# Patient Record
Sex: MALE | Race: WHITE | NOT HISPANIC OR LATINO
[De-identification: names, ages, dates, MRNs, and addresses within clinical notes are randomized per-mention and may not be internally consistent; named-entity substitution may affect disease eponyms.]

---

## 2018-07-03 PROBLEM — Z00.00 ENCOUNTER FOR PREVENTIVE HEALTH EXAMINATION: Status: ACTIVE | Noted: 2018-07-03

## 2018-07-09 ENCOUNTER — APPOINTMENT (OUTPATIENT)
Dept: ORTHOPEDIC SURGERY | Facility: CLINIC | Age: 28
End: 2018-07-09
Payer: COMMERCIAL

## 2018-07-09 VITALS
HEIGHT: 74 IN | WEIGHT: 200 LBS | DIASTOLIC BLOOD PRESSURE: 89 MMHG | SYSTOLIC BLOOD PRESSURE: 129 MMHG | HEART RATE: 71 BPM | BODY MASS INDEX: 25.67 KG/M2

## 2018-07-09 DIAGNOSIS — Z80.9 FAMILY HISTORY OF MALIGNANT NEOPLASM, UNSPECIFIED: ICD-10-CM

## 2018-07-09 PROCEDURE — 73502 X-RAY EXAM HIP UNI 2-3 VIEWS: CPT | Mod: 58

## 2018-07-09 PROCEDURE — 73590 X-RAY EXAM OF LOWER LEG: CPT | Mod: 58,RT

## 2018-07-09 PROCEDURE — 29515 APPLICATION SHORT LEG SPLINT: CPT | Mod: 58,RT

## 2018-07-09 PROCEDURE — 29085 APPL CAST HAND&LWR FOREARM: CPT | Mod: 79,LT

## 2018-07-09 PROCEDURE — 99024 POSTOP FOLLOW-UP VISIT: CPT

## 2018-07-09 PROCEDURE — 73110 X-RAY EXAM OF WRIST: CPT | Mod: 79,LT

## 2018-07-09 RX ORDER — DOCUSATE SODIUM 100 MG/1
100 CAPSULE ORAL
Refills: 0 | Status: ACTIVE | COMMUNITY

## 2018-07-09 RX ORDER — ASPIRIN 325 MG/1
325 TABLET, FILM COATED ORAL
Refills: 0 | Status: ACTIVE | COMMUNITY

## 2018-07-09 RX ORDER — OXYCODONE HYDROCHLORIDE AND ACETAMINOPHEN 5; 325 MG/1; MG/1
5-325 TABLET ORAL
Refills: 0 | Status: ACTIVE | COMMUNITY

## 2018-07-16 ENCOUNTER — TRANSCRIPTION ENCOUNTER (OUTPATIENT)
Age: 28
End: 2018-07-16

## 2018-07-16 ENCOUNTER — APPOINTMENT (OUTPATIENT)
Dept: ORTHOPEDIC SURGERY | Facility: CLINIC | Age: 28
End: 2018-07-16
Payer: COMMERCIAL

## 2018-07-16 PROCEDURE — 99024 POSTOP FOLLOW-UP VISIT: CPT

## 2018-08-06 ENCOUNTER — APPOINTMENT (OUTPATIENT)
Dept: ORTHOPEDIC SURGERY | Facility: CLINIC | Age: 28
End: 2018-08-06
Payer: COMMERCIAL

## 2018-08-06 PROCEDURE — 99024 POSTOP FOLLOW-UP VISIT: CPT

## 2018-08-06 PROCEDURE — 73110 X-RAY EXAM OF WRIST: CPT | Mod: 79,LT

## 2018-08-06 PROCEDURE — 73522 X-RAY EXAM HIPS BI 3-4 VIEWS: CPT | Mod: 58

## 2018-08-06 PROCEDURE — 73590 X-RAY EXAM OF LOWER LEG: CPT | Mod: 58,RT

## 2018-09-17 ENCOUNTER — APPOINTMENT (OUTPATIENT)
Dept: ORTHOPEDIC SURGERY | Facility: CLINIC | Age: 28
End: 2018-09-17
Payer: COMMERCIAL

## 2018-09-17 DIAGNOSIS — S32.415D: ICD-10-CM

## 2018-09-17 PROCEDURE — 73110 X-RAY EXAM OF WRIST: CPT | Mod: 79,LT

## 2018-09-17 PROCEDURE — 73590 X-RAY EXAM OF LOWER LEG: CPT | Mod: 58,RT

## 2018-09-17 PROCEDURE — 99024 POSTOP FOLLOW-UP VISIT: CPT

## 2018-09-17 PROCEDURE — 73502 X-RAY EXAM HIP UNI 2-3 VIEWS: CPT | Mod: 58

## 2018-10-29 ENCOUNTER — APPOINTMENT (OUTPATIENT)
Dept: ORTHOPEDIC SURGERY | Facility: CLINIC | Age: 28
End: 2018-10-29
Payer: COMMERCIAL

## 2018-10-29 PROCEDURE — 73590 X-RAY EXAM OF LOWER LEG: CPT | Mod: RT

## 2018-10-29 PROCEDURE — 99214 OFFICE O/P EST MOD 30 MIN: CPT

## 2018-11-01 ENCOUNTER — OUTPATIENT (OUTPATIENT)
Dept: OUTPATIENT SERVICES | Facility: HOSPITAL | Age: 28
LOS: 1 days | End: 2018-11-01
Payer: COMMERCIAL

## 2018-11-01 VITALS
HEIGHT: 74 IN | TEMPERATURE: 98 F | SYSTOLIC BLOOD PRESSURE: 130 MMHG | DIASTOLIC BLOOD PRESSURE: 80 MMHG | HEART RATE: 77 BPM | WEIGHT: 207.23 LBS | RESPIRATION RATE: 14 BRPM

## 2018-11-01 DIAGNOSIS — S82.301A UNSPECIFIED FRACTURE OF LOWER END OF RIGHT TIBIA, INITIAL ENCOUNTER FOR CLOSED FRACTURE: ICD-10-CM

## 2018-11-01 DIAGNOSIS — Z01.818 ENCOUNTER FOR OTHER PREPROCEDURAL EXAMINATION: ICD-10-CM

## 2018-11-01 DIAGNOSIS — S82.209A UNSPECIFIED FRACTURE OF SHAFT OF UNSPECIFIED TIBIA, INITIAL ENCOUNTER FOR CLOSED FRACTURE: ICD-10-CM

## 2018-11-01 DIAGNOSIS — Z96.7 PRESENCE OF OTHER BONE AND TENDON IMPLANTS: Chronic | ICD-10-CM

## 2018-11-01 PROCEDURE — G0463: CPT

## 2018-11-01 NOTE — H&P PST ADULT - NS SC CAGE ALCOHOL CUT DOWN
Patient awake, alert, and playful with mother and father at the bedside. Patient on continuous observation via pulse oximetry. Patient still febrile, Dr. Feldman aware, patient to receive home medication of albuterol, budesonide, and amox. Patient to received Tylenol of fever. Patient skin noted to be mottled. Wheezing heard bilaterally with course rhonchi. Patient to receive racemic epi neb treatment. no

## 2018-11-01 NOTE — H&P PST ADULT - RS GEN PE MLT RESP DETAILS PC
clear to auscultation bilaterally/no subcutaneous emphysema/no rales/no intercostal retractions/no rhonchi

## 2018-11-01 NOTE — H&P PST ADULT - MUSCULOSKELETAL COMMENTS
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

## 2018-11-01 NOTE — H&P PST ADULT - PROBLEM SELECTOR PLAN 1
scheduled for exchange nailing right tibia (synthesis) possible fibula osteotomy on 11/9/18  Pre op instructions

## 2018-11-01 NOTE — H&P PST ADULT - NSANTHOSAYNRD_GEN_A_CORE
No. SABRINA screening performed.  STOP BANG Legend: 0-2 = LOW Risk; 3-4 = INTERMEDIATE Risk; 5-8 = HIGH Risk
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
## 2018-11-08 ENCOUNTER — TRANSCRIPTION ENCOUNTER (OUTPATIENT)
Age: 28
End: 2018-11-08

## 2018-11-08 RX ORDER — FAMOTIDINE 10 MG/ML
1 INJECTION INTRAVENOUS
Qty: 0 | Refills: 0 | COMMUNITY
Start: 2018-11-08 | End: 2018-11-09

## 2018-11-09 ENCOUNTER — INPATIENT (INPATIENT)
Facility: HOSPITAL | Age: 28
LOS: 0 days | Discharge: HOME | DRG: 908 | End: 2018-11-10
Attending: SPECIALIST | Admitting: SPECIALIST
Payer: COMMERCIAL

## 2018-11-09 ENCOUNTER — APPOINTMENT (OUTPATIENT)
Dept: ORTHOPEDIC SURGERY | Facility: HOSPITAL | Age: 28
End: 2018-11-09

## 2018-11-09 ENCOUNTER — TRANSCRIPTION ENCOUNTER (OUTPATIENT)
Age: 28
End: 2018-11-09

## 2018-11-09 ENCOUNTER — RESULT REVIEW (OUTPATIENT)
Age: 28
End: 2018-11-09

## 2018-11-09 VITALS
TEMPERATURE: 98 F | OXYGEN SATURATION: 100 % | HEIGHT: 74 IN | HEART RATE: 86 BPM | SYSTOLIC BLOOD PRESSURE: 145 MMHG | RESPIRATION RATE: 18 BRPM | DIASTOLIC BLOOD PRESSURE: 83 MMHG | WEIGHT: 203.71 LBS

## 2018-11-09 DIAGNOSIS — Z96.7 PRESENCE OF OTHER BONE AND TENDON IMPLANTS: Chronic | ICD-10-CM

## 2018-11-09 DIAGNOSIS — Z01.818 ENCOUNTER FOR OTHER PREPROCEDURAL EXAMINATION: ICD-10-CM

## 2018-11-09 DIAGNOSIS — S82.301A UNSPECIFIED FRACTURE OF LOWER END OF RIGHT TIBIA, INITIAL ENCOUNTER FOR CLOSED FRACTURE: ICD-10-CM

## 2018-11-09 LAB
HCT VFR BLD CALC: 36.6 % — LOW (ref 39–50)
HGB BLD-MCNC: 12.6 G/DL — LOW (ref 13–17)
MCHC RBC-ENTMCNC: 29 PG — SIGNIFICANT CHANGE UP (ref 27–34)
MCHC RBC-ENTMCNC: 34.4 GM/DL — SIGNIFICANT CHANGE UP (ref 32–36)
MCV RBC AUTO: 84.1 FL — SIGNIFICANT CHANGE UP (ref 80–100)
NRBC # BLD: 0 /100 WBCS — SIGNIFICANT CHANGE UP (ref 0–0)
PLATELET # BLD AUTO: 259 K/UL — SIGNIFICANT CHANGE UP (ref 150–400)
RBC # BLD: 4.35 M/UL — SIGNIFICANT CHANGE UP (ref 4.2–5.8)
RBC # FLD: 14 % — SIGNIFICANT CHANGE UP (ref 10.3–14.5)
WBC # BLD: 18.1 K/UL — HIGH (ref 3.8–10.5)
WBC # FLD AUTO: 18.1 K/UL — HIGH (ref 3.8–10.5)

## 2018-11-09 PROCEDURE — 12345: CPT | Mod: NC

## 2018-11-09 PROCEDURE — 88302 TISSUE EXAM BY PATHOLOGIST: CPT | Mod: 26

## 2018-11-09 PROCEDURE — 88300 SURGICAL PATH GROSS: CPT | Mod: 26

## 2018-11-09 RX ORDER — METOCLOPRAMIDE HCL 10 MG
10 TABLET ORAL ONCE
Qty: 0 | Refills: 0 | Status: DISCONTINUED | OUTPATIENT
Start: 2018-11-09 | End: 2018-11-10

## 2018-11-09 RX ORDER — ONDANSETRON 8 MG/1
4 TABLET, FILM COATED ORAL ONCE
Qty: 0 | Refills: 0 | Status: DISCONTINUED | OUTPATIENT
Start: 2018-11-09 | End: 2018-11-09

## 2018-11-09 RX ORDER — ONDANSETRON 8 MG/1
4 TABLET, FILM COATED ORAL EVERY 6 HOURS
Qty: 0 | Refills: 0 | Status: DISCONTINUED | OUTPATIENT
Start: 2018-11-09 | End: 2018-11-10

## 2018-11-09 RX ORDER — CEFAZOLIN SODIUM 1 G
2000 VIAL (EA) INJECTION EVERY 8 HOURS
Qty: 0 | Refills: 0 | Status: COMPLETED | OUTPATIENT
Start: 2018-11-09 | End: 2018-11-10

## 2018-11-09 RX ORDER — DOCUSATE SODIUM 100 MG
1 CAPSULE ORAL
Qty: 0 | Refills: 0 | COMMUNITY
Start: 2018-11-09

## 2018-11-09 RX ORDER — HYDROMORPHONE HYDROCHLORIDE 2 MG/ML
0.5 INJECTION INTRAMUSCULAR; INTRAVENOUS; SUBCUTANEOUS
Qty: 0 | Refills: 0 | Status: DISCONTINUED | OUTPATIENT
Start: 2018-11-09 | End: 2018-11-09

## 2018-11-09 RX ORDER — ENOXAPARIN SODIUM 100 MG/ML
40 INJECTION SUBCUTANEOUS EVERY 24 HOURS
Qty: 0 | Refills: 0 | Status: DISCONTINUED | OUTPATIENT
Start: 2018-11-10 | End: 2018-11-10

## 2018-11-09 RX ORDER — SODIUM CHLORIDE 9 MG/ML
1000 INJECTION, SOLUTION INTRAVENOUS
Qty: 0 | Refills: 0 | Status: DISCONTINUED | OUTPATIENT
Start: 2018-11-09 | End: 2018-11-09

## 2018-11-09 RX ORDER — OXYCODONE HYDROCHLORIDE 5 MG/1
10 TABLET ORAL
Qty: 0 | Refills: 0 | Status: DISCONTINUED | OUTPATIENT
Start: 2018-11-09 | End: 2018-11-10

## 2018-11-09 RX ORDER — CEFAZOLIN SODIUM 1 G
2000 VIAL (EA) INJECTION ONCE
Qty: 0 | Refills: 0 | Status: COMPLETED | OUTPATIENT
Start: 2018-11-09 | End: 2018-11-09

## 2018-11-09 RX ORDER — CHLORHEXIDINE GLUCONATE 213 G/1000ML
1 SOLUTION TOPICAL ONCE
Qty: 0 | Refills: 0 | Status: COMPLETED | OUTPATIENT
Start: 2018-11-09 | End: 2018-11-09

## 2018-11-09 RX ORDER — OXYCODONE HYDROCHLORIDE 5 MG/1
5 TABLET ORAL
Qty: 0 | Refills: 0 | Status: DISCONTINUED | OUTPATIENT
Start: 2018-11-09 | End: 2018-11-10

## 2018-11-09 RX ORDER — PANTOPRAZOLE SODIUM 20 MG/1
40 TABLET, DELAYED RELEASE ORAL
Qty: 0 | Refills: 0 | Status: DISCONTINUED | OUTPATIENT
Start: 2018-11-09 | End: 2018-11-10

## 2018-11-09 RX ORDER — HYDROMORPHONE HYDROCHLORIDE 2 MG/ML
0.5 INJECTION INTRAMUSCULAR; INTRAVENOUS; SUBCUTANEOUS
Qty: 0 | Refills: 0 | Status: DISCONTINUED | OUTPATIENT
Start: 2018-11-09 | End: 2018-11-10

## 2018-11-09 RX ORDER — SODIUM CHLORIDE 9 MG/ML
1000 INJECTION, SOLUTION INTRAVENOUS
Qty: 0 | Refills: 0 | Status: DISCONTINUED | OUTPATIENT
Start: 2018-11-09 | End: 2018-11-10

## 2018-11-09 RX ORDER — DOCUSATE SODIUM 100 MG
100 CAPSULE ORAL THREE TIMES A DAY
Qty: 0 | Refills: 0 | Status: DISCONTINUED | OUTPATIENT
Start: 2018-11-09 | End: 2018-11-10

## 2018-11-09 RX ADMIN — ONDANSETRON 4 MILLIGRAM(S): 8 TABLET, FILM COATED ORAL at 15:11

## 2018-11-09 RX ADMIN — OXYCODONE HYDROCHLORIDE 5 MILLIGRAM(S): 5 TABLET ORAL at 21:19

## 2018-11-09 RX ADMIN — HYDROMORPHONE HYDROCHLORIDE 0.5 MILLIGRAM(S): 2 INJECTION INTRAMUSCULAR; INTRAVENOUS; SUBCUTANEOUS at 14:19

## 2018-11-09 RX ADMIN — Medication 100 MILLIGRAM(S): at 18:25

## 2018-11-09 RX ADMIN — OXYCODONE HYDROCHLORIDE 5 MILLIGRAM(S): 5 TABLET ORAL at 21:49

## 2018-11-09 RX ADMIN — HYDROMORPHONE HYDROCHLORIDE 0.5 MILLIGRAM(S): 2 INJECTION INTRAMUSCULAR; INTRAVENOUS; SUBCUTANEOUS at 13:51

## 2018-11-09 RX ADMIN — Medication 100 MILLIGRAM(S): at 21:19

## 2018-11-09 RX ADMIN — CHLORHEXIDINE GLUCONATE 1 APPLICATION(S): 213 SOLUTION TOPICAL at 09:23

## 2018-11-09 RX ADMIN — HYDROMORPHONE HYDROCHLORIDE 0.5 MILLIGRAM(S): 2 INJECTION INTRAMUSCULAR; INTRAVENOUS; SUBCUTANEOUS at 14:24

## 2018-11-09 RX ADMIN — HYDROMORPHONE HYDROCHLORIDE 0.5 MILLIGRAM(S): 2 INJECTION INTRAMUSCULAR; INTRAVENOUS; SUBCUTANEOUS at 23:08

## 2018-11-09 RX ADMIN — HYDROMORPHONE HYDROCHLORIDE 0.5 MILLIGRAM(S): 2 INJECTION INTRAMUSCULAR; INTRAVENOUS; SUBCUTANEOUS at 23:38

## 2018-11-09 RX ADMIN — HYDROMORPHONE HYDROCHLORIDE 0.5 MILLIGRAM(S): 2 INJECTION INTRAMUSCULAR; INTRAVENOUS; SUBCUTANEOUS at 13:30

## 2018-11-09 RX ADMIN — Medication 200 MILLIGRAM(S): at 17:05

## 2018-11-09 RX ADMIN — SODIUM CHLORIDE 100 MILLILITER(S): 9 INJECTION, SOLUTION INTRAVENOUS at 13:17

## 2018-11-09 NOTE — DISCHARGE NOTE ADULT - HOSPITAL COURSE
This patient was admitted to [facility] with a history of nonunion of the right tibia.  Patient went to Pre-Surgical Testing at [facility] and was medically cleared to undergo procedure. Patient underwent exchange nailing right tibia by Dr. Eliseo Henriquez on 11/9/18. Procedure was well tolerated.  No operative or tess-operative complications arose during patients hospital course.  Patient received antibiotic according to SCIP guidelines for infection prevention.  Lovenox was given for DVT prophylaxis.  Anesthesia, Medical Hospitalist, Physical Therapy and Occupational Therapy were consulted. Patient is stable for discharge with a good prognosis.  Appropriate discharge instructions and medications are provided in this document.

## 2018-11-09 NOTE — DISCHARGE NOTE ADULT - MEDICATION SUMMARY - MEDICATIONS TO TAKE
I will START or STAY ON the medications listed below when I get home from the hospital:    oxyCODONE 5 mg oral tablet  -- 1 tab(s) by mouth every 6 hours, As Needed -Mild Pain (1 - 3) MDD:4  -- Indication: For as needed for pain    Aspirin Enteric Coated 81 mg oral delayed release tablet  -- 1 tab(s) by mouth 2 times a day MDD:2  -- Swallow whole.  Do not crush.  Take with food or milk.    -- Indication: For prevention of blood clots    docusate sodium 100 mg oral capsule  -- 1 cap(s) by mouth 3 times a day  -- Indication: For stool softner

## 2018-11-09 NOTE — DISCHARGE NOTE ADULT - NS AS ACTIVITY OBS
Stairs allowed/Walking-Outdoors allowed/Walking-Indoors allowed/No Heavy lifting/straining/Do not drive or operate machinery

## 2018-11-09 NOTE — DISCHARGE NOTE ADULT - MEDICATION SUMMARY - MEDICATIONS TO STOP TAKING
I will STOP taking the medications listed below when I get home from the hospital:    Pepcid AC Maximum Strength 20 mg oral tablet  -- 1 tab(s) by mouth 2 times a day(x2 preop)

## 2018-11-09 NOTE — BRIEF OPERATIVE NOTE - PROCEDURE
<<-----Click on this checkbox to enter Procedure Exchange nailing for nonunion of right tibial fracture  11/09/2018    Active  Flaco Loredo

## 2018-11-09 NOTE — DISCHARGE NOTE ADULT - INSTRUCTIONS
none  For Constipation :   • Increase your water intake. Drink at least 8 glasses of water daily.  • Try adding fiber to your diet by eating fruits, vegetables and foods that are rich in grains.  • If you do experience constipation, you may take an over-the-counter stool softener/laxative such as Velma Colace, Senek[place] or  Milk of Magnesia.

## 2018-11-09 NOTE — DISCHARGE NOTE ADULT - PATIENT PORTAL LINK FT
You can access the [facility], offered by [facility], by registering with the following website: [email or web address]

## 2018-11-09 NOTE — DISCHARGE NOTE ADULT - CARE PLAN
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

## 2018-11-09 NOTE — DISCHARGE NOTE ADULT - CARE PROVIDER_API CALL
Eliseo Henriquez), Orthopaedic Surgery  [address] [place] [address]  Phone: [phone]  Fax: [phone]

## 2018-11-09 NOTE — PROGRESS NOTE ADULT - SUBJECTIVE AND OBJECTIVE BOX
Orthopaedic Post Op  Note    Procedure: Exchange IM nailing of tibial non-union  Surgeon: Eliseo Henriquez    28y Male comfortable, without complaints. Reported pain score = 3  Denies N/V, CP, SOB, numbness/tingling of extremities.    PE:  Vital Signs Last 24 Hrs  T(C): 36.8 (09 Nov 2018 15:05), Max: 36.9 (09 Nov 2018 09:24)  T(F): 98.2 (09 Nov 2018 15:05), Max: 98.4 (09 Nov 2018 09:24)  HR: 93 (09 Nov 2018 15:05) (86 - 138)  BP: 135/81 (09 Nov 2018 15:05) (135/81 - 150/89)  RR: 16 (09 Nov 2018 15:05) (10 - 19)  SpO2: 98% (09 Nov 2018 15:05) (97% - 100%)  General: Pt alert and oriented   Lungs: + BS CTA bilaterally  Heart: +S1 & S2 heard, RRR  Abd: + BS heard, soft, NT, ND  Right Lower Extremity Dressing: C/D/I   Bilateral LEs:  Motor:   5/5 dorsiflexion, plantarflexion, EHL  Sensation intact to LT   2+ DP Pulses  SCDs in place    A/P: 28y Male POD#0 s/p Right Tibial Non-union IM Nail exchange  - Stable  -  Oxycodone for Pain Control   - DVT ppx: Lovenox, change to Aspirin 81mg twice daily for 6 weeks upon d/c  - Velma op IV abx: Ancef  - PT, OT per protocol  - F/U AM Labs  DCP = home tomorrow

## 2018-11-10 VITALS
HEART RATE: 101 BPM | TEMPERATURE: 98 F | SYSTOLIC BLOOD PRESSURE: 126 MMHG | RESPIRATION RATE: 18 BRPM | DIASTOLIC BLOOD PRESSURE: 68 MMHG | OXYGEN SATURATION: 97 %

## 2018-11-10 LAB
ANION GAP SERPL CALC-SCNC: 11 MMOL/L — SIGNIFICANT CHANGE UP (ref 5–17)
BUN SERPL-MCNC: 11 MG/DL — SIGNIFICANT CHANGE UP (ref 7–23)
CALCIUM SERPL-MCNC: 9.5 MG/DL — SIGNIFICANT CHANGE UP (ref 8.4–10.5)
CHLORIDE SERPL-SCNC: 104 MMOL/L — SIGNIFICANT CHANGE UP (ref 96–108)
CO2 SERPL-SCNC: 27 MMOL/L — SIGNIFICANT CHANGE UP (ref 22–31)
CREAT SERPL-MCNC: 0.91 MG/DL — SIGNIFICANT CHANGE UP (ref 0.5–1.3)
GLUCOSE SERPL-MCNC: 108 MG/DL — HIGH (ref 70–99)
HCT VFR BLD CALC: 34.3 % — LOW (ref 39–50)
HGB BLD-MCNC: 12.2 G/DL — LOW (ref 13–17)
MCHC RBC-ENTMCNC: 29 PG — SIGNIFICANT CHANGE UP (ref 27–34)
MCHC RBC-ENTMCNC: 35.6 GM/DL — SIGNIFICANT CHANGE UP (ref 32–36)
MCV RBC AUTO: 81.7 FL — SIGNIFICANT CHANGE UP (ref 80–100)
NRBC # BLD: 0 /100 WBCS — SIGNIFICANT CHANGE UP (ref 0–0)
PLATELET # BLD AUTO: 263 K/UL — SIGNIFICANT CHANGE UP (ref 150–400)
POTASSIUM SERPL-MCNC: 3.7 MMOL/L — SIGNIFICANT CHANGE UP (ref 3.5–5.3)
POTASSIUM SERPL-SCNC: 3.7 MMOL/L — SIGNIFICANT CHANGE UP (ref 3.5–5.3)
RBC # BLD: 4.2 M/UL — SIGNIFICANT CHANGE UP (ref 4.2–5.8)
RBC # FLD: 14.4 % — SIGNIFICANT CHANGE UP (ref 10.3–14.5)
SODIUM SERPL-SCNC: 142 MMOL/L — SIGNIFICANT CHANGE UP (ref 135–145)
WBC # BLD: 12.61 K/UL — HIGH (ref 3.8–10.5)
WBC # FLD AUTO: 12.61 K/UL — HIGH (ref 3.8–10.5)

## 2018-11-10 RX ORDER — ASPIRIN/CALCIUM CARB/MAGNESIUM 324 MG
1 TABLET ORAL
Qty: 84 | Refills: 0 | OUTPATIENT
Start: 2018-11-10 | End: 2018-12-21

## 2018-11-10 RX ORDER — OXYCODONE HYDROCHLORIDE 5 MG/1
1 TABLET ORAL
Qty: 20 | Refills: 0 | OUTPATIENT
Start: 2018-11-10

## 2018-11-10 RX ADMIN — OXYCODONE HYDROCHLORIDE 10 MILLIGRAM(S): 5 TABLET ORAL at 10:30

## 2018-11-10 RX ADMIN — Medication 100 MILLIGRAM(S): at 01:31

## 2018-11-10 RX ADMIN — PANTOPRAZOLE SODIUM 40 MILLIGRAM(S): 20 TABLET, DELAYED RELEASE ORAL at 05:22

## 2018-11-10 RX ADMIN — OXYCODONE HYDROCHLORIDE 5 MILLIGRAM(S): 5 TABLET ORAL at 14:01

## 2018-11-10 RX ADMIN — OXYCODONE HYDROCHLORIDE 10 MILLIGRAM(S): 5 TABLET ORAL at 09:34

## 2018-11-10 RX ADMIN — Medication 100 MILLIGRAM(S): at 05:22

## 2018-11-10 RX ADMIN — OXYCODONE HYDROCHLORIDE 5 MILLIGRAM(S): 5 TABLET ORAL at 13:21

## 2018-11-10 RX ADMIN — ENOXAPARIN SODIUM 40 MILLIGRAM(S): 100 INJECTION SUBCUTANEOUS at 09:34

## 2018-11-10 RX ADMIN — OXYCODONE HYDROCHLORIDE 10 MILLIGRAM(S): 5 TABLET ORAL at 06:41

## 2018-11-10 RX ADMIN — Medication 100 MILLIGRAM(S): at 13:21

## 2018-11-10 RX ADMIN — OXYCODONE HYDROCHLORIDE 10 MILLIGRAM(S): 5 TABLET ORAL at 06:11

## 2018-11-10 NOTE — PROGRESS NOTE ADULT - SUBJECTIVE AND OBJECTIVE BOX
Post Op     ELVIS BRENNAN      28y        Male                                                                                                                 T(C): 36.9 (11-10-18 @ 07:53), Max: 36.9 (11-10-18 @ 07:53)  HR: 101 (11-10-18 @ 07:53) (80 - 101)  BP: 126/68 (11-10-18 @ 07:53) (120/75 - 150/77)  RR: 18 (11-10-18 @ 07:53) (16 - 18)  SpO2: 97% (11-10-18 @ 07:53) (97% - 100%)  Wt(kg): --    S/P exchange of  tibial tammy 2ndery to nonunion    Patient denies shortness of breath, chest pain, dyspnea, No complaints  Pain is 3 /10    Physical Exam    Extremity: Bilaterally:  No holmon                                           No Cord                                          PAS on b/l                                          Neurovascular intact                                          Motor intact EHL/FHL                                          Sensation intact                                          Pulses intact DP/PT                                         Calves Soft                                         Dressing Clean / Dry / Intact changed   due to saturation                                          Capillary refill with 5 seconds                          12.2   12.61 )-----------( 263      ( 10 Nov 2018 06:42 )             34.3       11-10    142  |  104  |  11  ----------------------------<  108<H>  3.7   |  27  |  0.91    Ca    9.5      10 Nov 2018 06:42        A/P  -- S/P exchange of  tibial tammy 2ndery to nonunion    -  Medicine To Follow   - DVT prophylaxis PAS ASA 81mg po bid  - PT & OT   - Analagesia  - Incentive Spirometry  - Discharge Planning  - Safety Precautions  -  CBC , BMP daily   Dr. Henriquez called aware patient to be discharged  patient to follow up in office on thursday  - Discussed post op  care  / wound care and gave dressing to patient  . patient and  family understand all instructions  - Plan as per MD

## 2018-11-10 NOTE — OCCUPATIONAL THERAPY INITIAL EVALUATION ADULT - ADDITIONAL COMMENTS
Lives with his girlfriend 4 steps to enter, 4 inside Tub. Can use stall at parents house. Has a commode and seat for shower. Has crutches.

## 2018-11-10 NOTE — OCCUPATIONAL THERAPY INITIAL EVALUATION ADULT - NS ASR FOLLOW COMMAND OT EVAL
Patient educated regarding fall prevention and home/hospital safety. Pt educated on use of AE/DME recommended for safety & the need to call for assistance. Patient with good understanding. Role of OT and patient goals discussed./100% of the time

## 2018-11-10 NOTE — PHYSICAL THERAPY INITIAL EVALUATION ADULT - RANGE OF MOTION EXAMINATION, REHAB EVAL
bilateral upper extremity ROM was WNL (within normal limits)/Left LE ROM was WNL (within normal limits)/right hip wnl; knee n/t due to surgical precautions

## 2018-11-10 NOTE — PHYSICAL THERAPY INITIAL EVALUATION ADULT - DID THE PATIENT HAVE SURGERY?
Scheduled for Exchange nailing right tibia; possible fibula osteotomy/yes
yes/Right tibial nail exchange

## 2018-11-12 PROBLEM — N41.9 INFLAMMATORY DISEASE OF PROSTATE, UNSPECIFIED: Chronic | Status: ACTIVE | Noted: 2018-11-01
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
## 2018-11-15 ENCOUNTER — APPOINTMENT (OUTPATIENT)
Dept: ORTHOPEDIC SURGERY | Facility: CLINIC | Age: 28
End: 2018-11-15
Payer: COMMERCIAL

## 2018-11-15 PROCEDURE — 99024 POSTOP FOLLOW-UP VISIT: CPT

## 2018-11-15 PROCEDURE — 73590 X-RAY EXAM OF LOWER LEG: CPT | Mod: RT

## 2018-11-20 ENCOUNTER — APPOINTMENT (OUTPATIENT)
Dept: ORTHOPEDIC SURGERY | Facility: CLINIC | Age: 28
End: 2018-11-20
Payer: COMMERCIAL

## 2018-11-20 VITALS — WEIGHT: 200 LBS | HEIGHT: 74 IN | BODY MASS INDEX: 25.67 KG/M2

## 2018-11-20 DIAGNOSIS — S52.532D COLLES' FRACTURE OF LEFT RADIUS, SUBSEQUENT ENCOUNTER FOR CLOSED FRACTURE WITH ROUTINE HEALING: ICD-10-CM

## 2018-11-20 PROCEDURE — 99024 POSTOP FOLLOW-UP VISIT: CPT

## 2018-12-17 ENCOUNTER — APPOINTMENT (OUTPATIENT)
Dept: ORTHOPEDIC SURGERY | Facility: CLINIC | Age: 28
End: 2018-12-17
Payer: COMMERCIAL

## 2018-12-17 PROCEDURE — 99024 POSTOP FOLLOW-UP VISIT: CPT

## 2018-12-17 PROCEDURE — 73590 X-RAY EXAM OF LOWER LEG: CPT | Mod: RT

## 2019-01-28 ENCOUNTER — APPOINTMENT (OUTPATIENT)
Dept: ORTHOPEDIC SURGERY | Facility: CLINIC | Age: 29
End: 2019-01-28
Payer: COMMERCIAL

## 2019-01-28 VITALS — HEIGHT: 74 IN | WEIGHT: 200 LBS | BODY MASS INDEX: 25.67 KG/M2

## 2019-01-28 PROCEDURE — 73590 X-RAY EXAM OF LOWER LEG: CPT | Mod: RT

## 2019-01-28 PROCEDURE — 99024 POSTOP FOLLOW-UP VISIT: CPT

## 2019-01-28 NOTE — END OF VISIT
[FreeTextEntry3] : I, Eliseo Henriquez MD, ordering physician, have read and attest that all the information, medical decision making and discharge instructions within are true and accurate\par

## 2019-01-28 NOTE — ADDENDUM
[FreeTextEntry1] : I, Mary Coyle wrote this note acting as a scribe for Dr. Eliseo Henriquez on Jan 28, 2019.

## 2019-01-28 NOTE — HISTORY OF PRESENT ILLNESS
[de-identified] : s/p exchange nailing with fibular osteotomy. \par DOS: 11/09/18 [de-identified] : The patient returns for 4th  postoperative visit after undergoing exchange nailing with fibular osteotomy at [facility]. The surgery was on 11/09/2018. The patient is recovering well. He is WBAT without assistive device.  He has residual pain at the superior and inferior postop op incisions. He has been receiving PT and would like to continue with it. Patient was given a script for a bone stimulator during his last office visit which he has been using. He has not yet returned to work as a  due to his pain and overall condition. He still has trouble descending the stairs.  [de-identified] : Patient is WDWN, alert and in NAD. Breathing is unlabored. He is grossly oriented to person, place and time. \par Right lower leg: the incisions are healed with no signs of infection. Mild tenderness and edema are present. He is able to do leg lifts. [de-identified] : AP, Lat ,and oblique views of the right ankle were obtained and revealed s/p fibular osteotomy and distal tibia nail and locking bolt hardware is in good position. Fracture gap is significantly decreased. Alignment is acceptable. Fracture healing has improved significantly since last office visit.  [de-identified] : A new script for PT was given. Range of motion exercises were encouraged. NSAIDs as tolerated. \par Follow up in 6 weeks for repeat x-rays.

## 2019-03-25 ENCOUNTER — APPOINTMENT (OUTPATIENT)
Dept: ORTHOPEDIC SURGERY | Facility: CLINIC | Age: 29
End: 2019-03-25
Payer: COMMERCIAL

## 2019-03-25 DIAGNOSIS — S82.263F: ICD-10-CM

## 2019-03-25 PROCEDURE — 73590 X-RAY EXAM OF LOWER LEG: CPT | Mod: RT

## 2019-03-25 PROCEDURE — 99214 OFFICE O/P EST MOD 30 MIN: CPT

## 2019-03-25 NOTE — END OF VISIT
[FreeTextEntry3] : I, Eliseo Henriquez MD, ordering physician, have read and attest that all the information, medical decision making and discharge instructions within are true and accurate.

## 2019-03-25 NOTE — PHYSICAL EXAM
[de-identified] : Patient is WDWN, alert and in NAD. Breathing is unlabored. He is grossly oriented to person, place and time. \par \par Right lower leg: The incisions are healed with no signs of infection. Mild tenderness and edema are present. He is able to do leg lifts. \par  [de-identified] : AP, Lat ,and oblique views of the right tibia were obtained and revealed s/p fibular osteotomy andl tibia nail. Hardware  is in good position. Fracture is healing well. Alignment is acceptable. Fracture healing has improved significantly since last office visit. \par

## 2019-03-25 NOTE — HISTORY OF PRESENT ILLNESS
[de-identified] : The patient returns for a followup visit after undergoing exchange nailing with fibular osteotomy at [facility]. The surgery was on 11/09/2018. The patient is recovering well. He is FWB without assistive device. He has residual pain at the superior and inferior postop op incisions. Patient still has weakness in the leg and is still unable to run. He has been receiving PT. Patient was given a script for a bone stimulator which he has been using. He has not yet returned to work as a  due to his pain and overall condition.

## 2019-03-25 NOTE — ADDENDUM
[FreeTextEntry1] : I, Mary Coyle wrote this note acting as a scribe for Dr. Eliseo Henriquez on Mar 25, 2019.

## 2019-03-25 NOTE — DISCUSSION/SUMMARY
[de-identified] : Continue with PT and gym. Patient may return to physical activity, no restrictions. \par Range of motion exercises were encouraged. \par NSAIDs as tolerated. \par Follow up in 3 months. Xrays upon return.

## 2019-04-24 PROCEDURE — 80048 BASIC METABOLIC PNL TOTAL CA: CPT

## 2019-04-24 PROCEDURE — 97165 OT EVAL LOW COMPLEX 30 MIN: CPT

## 2019-04-24 PROCEDURE — C1713: CPT

## 2019-04-24 PROCEDURE — 97161 PT EVAL LOW COMPLEX 20 MIN: CPT | Mod: CI

## 2019-04-24 PROCEDURE — G8980: CPT | Mod: CI

## 2019-04-24 PROCEDURE — 76000 FLUOROSCOPY <1 HR PHYS/QHP: CPT

## 2019-04-24 PROCEDURE — G8978: CPT | Mod: CI

## 2019-04-24 PROCEDURE — 36415 COLL VENOUS BLD VENIPUNCTURE: CPT

## 2019-04-24 PROCEDURE — C1889: CPT

## 2019-04-24 PROCEDURE — 85027 COMPLETE CBC AUTOMATED: CPT

## 2019-04-24 PROCEDURE — 97164 PT RE-EVAL EST PLAN CARE: CPT

## 2019-04-24 PROCEDURE — G8979: CPT | Mod: CI

## 2019-04-24 PROCEDURE — 94664 DEMO&/EVAL PT USE INHALER: CPT

## 2019-04-24 PROCEDURE — 88300 SURGICAL PATH GROSS: CPT

## 2019-04-24 PROCEDURE — 88302 TISSUE EXAM BY PATHOLOGIST: CPT

## 2019-05-03 ENCOUNTER — CHART COPY (OUTPATIENT)
Age: 29
End: 2019-05-03

## 2019-06-24 ENCOUNTER — APPOINTMENT (OUTPATIENT)
Dept: ORTHOPEDIC SURGERY | Facility: CLINIC | Age: 29
End: 2019-06-24
Payer: COMMERCIAL

## 2019-06-24 VITALS — BODY MASS INDEX: 25.67 KG/M2 | HEIGHT: 74 IN | WEIGHT: 200 LBS

## 2019-06-24 DIAGNOSIS — S82.251S: ICD-10-CM

## 2019-06-24 PROCEDURE — 99213 OFFICE O/P EST LOW 20 MIN: CPT

## 2019-06-24 PROCEDURE — 73610 X-RAY EXAM OF ANKLE: CPT | Mod: RT

## 2019-06-24 PROCEDURE — 73590 X-RAY EXAM OF LOWER LEG: CPT | Mod: RT

## 2019-06-24 NOTE — HISTORY OF PRESENT ILLNESS
[de-identified] : The patient returns for a followup visit after undergoing exchange nailing with fibular osteotomy at [facility]. The surgery was on 11/09/2018. The patient is recovering well. He is FWB without an assistive device. He has residual pain at the superior and inferior postop op incisions. Patient still has weakness in the leg and is still unable to run. He has been receiving PT. Patient was given a script for a bone stimulator which he has been using. He has not yet returned to work as a  due to his pain and overall condition but would like to.

## 2019-06-24 NOTE — PHYSICAL EXAM
[de-identified] : Patient is WDWN, alert and in NAD. Breathing is unlabored. He is grossly oriented to person, place and time. \par \par Right lower leg: The incisions are healed with no signs of infection. Mild tenderness and edema are present. He is able to do leg lifts. \par  [de-identified] : AP, Lat ,and oblique views of the right tib/fib and ankle were obtained and revealed s/p fibular osteotomy and tibia nail. Hardware is in good position. Alignment is acceptable. Fracture is solidly healed.

## 2019-06-24 NOTE — ADDENDUM
[FreeTextEntry1] : I, Mary Coyle wrote this note acting as a scribe for Dr. Eliseo Henriquez on Jun 24, 2019.

## 2019-06-24 NOTE — DISCUSSION/SUMMARY
[de-identified] : Continue with PT and gym. Patient may return to physical activity, no restrictions. \par Range of motion exercises were encouraged. \par NSAIDs as tolerated. \par He was advised to return to work when he feels ready to. \par Follow up as needed.

## 2019-12-19 ENCOUNTER — APPOINTMENT (OUTPATIENT)
Dept: ORTHOPEDIC SURGERY | Facility: CLINIC | Age: 29
End: 2019-12-19
Payer: COMMERCIAL

## 2019-12-19 DIAGNOSIS — S82.254K: ICD-10-CM

## 2019-12-19 PROCEDURE — 73590 X-RAY EXAM OF LOWER LEG: CPT | Mod: RT

## 2019-12-19 PROCEDURE — 99213 OFFICE O/P EST LOW 20 MIN: CPT

## 2019-12-19 PROCEDURE — 73610 X-RAY EXAM OF ANKLE: CPT | Mod: RT

## 2019-12-19 NOTE — DISCUSSION/SUMMARY
[de-identified] : The option of hardware removal was discussed. \par Patient may return to physical activity, no restrictions. \par Range of motion exercises were encouraged. \par NSAIDs as tolerated. \par He will contact the office if or when he is ready to schedule surgery for removal of proximal dynamic screw. \par

## 2019-12-19 NOTE — PHYSICAL EXAM
[de-identified] : Patient is WDWN, alert and in NAD. Breathing is unlabored. He is grossly oriented to person, place and time. \par \par Right lower leg: The incisions are healed with no signs of infection. Mild tenderness over the anterior leg overlying hardware site. He is able to do leg lifts. \par  [de-identified] : AP, Lat ,and oblique views of the right tib/fib and ankle were obtained today s/p fibular osteotomy and tibia nail. Hardware is in good position. Proximal dynamic screw has not backed out or failed. Alignment is acceptable. Fracture is solidly healed.

## 2019-12-19 NOTE — HISTORY OF PRESENT ILLNESS
[de-identified] : The patient returns for a followup visit after undergoing exchange nailing with fibular osteotomy at [facility]. The surgery was on 11/09/2018. The patient is recovering well. He is FWB without an assistive device. He has since finished PT. Today, he admits that he does not have any limitations following the surgery, however there is residual discomfort at the superior aspect where the one proximal screw is located. He would like to discuss the option of having the hardware removed.

## 2019-12-19 NOTE — ADDENDUM
[FreeTextEntry1] : I, Mary Coyle wrote this note acting as a scribe for Dr. Eliseo Henriquez on Dec 19, 2019.

## 2021-04-05 ENCOUNTER — TRANSCRIPTION ENCOUNTER (OUTPATIENT)
Age: 31
End: 2021-04-05

## 2021-04-09 ENCOUNTER — TRANSCRIPTION ENCOUNTER (OUTPATIENT)
Age: 31
End: 2021-04-09

## 2021-04-18 ENCOUNTER — TRANSCRIPTION ENCOUNTER (OUTPATIENT)
Age: 31
End: 2021-04-18

## 2021-11-03 ENCOUNTER — TRANSCRIPTION ENCOUNTER (OUTPATIENT)
Age: 31
End: 2021-11-03

## 2022-02-04 ENCOUNTER — TRANSCRIPTION ENCOUNTER (OUTPATIENT)
Age: 32
End: 2022-02-04

## 2022-09-13 NOTE — H&P PST ADULT - BP NONINVASIVE MEAN (MM HG)
[facility], [facility]  [address] [place], [facility]  Phone: [phone]   Fax:     [phone]    Physical Therapy  Cancellation/No-show Note  Patient Name:  Slime Varma  :  1965   Date:  2022  Cancelled visits to date: 0  No-shows to date: 3    Patient status for today's appointment patient:  []  Cancelled  []  Rescheduled appointment  [x]  No-show     Reason given by patient:  []  Patient ill  []  Conflicting appointment  []  No transportation    []  Conflict with work  []  No reason given  [x]  Other:     Comments:  appts taken out    Phone call information:   []  Phone call made today to patient at _ time at number provided:      []  Patient answered, conversation as follows:    []  Patient did not answer, message left as follows:  []  Phone call not made today    Electronically signed by:  Haydee Giron #[id number]

## 2023-08-25 ENCOUNTER — TRANSCRIPTION ENCOUNTER (OUTPATIENT)
Age: 33
End: 2023-08-25

## 2024-05-09 NOTE — H&P PST ADULT - FUNCTIONAL SCREEN CURRENT LEVEL: COMMUNICATION, MLM
Overall seems to be doing well.  Try to promote a little more physical activity which I feel will help with the energy level.  Continue current meds as is   0 = understands/communicates without difficulty

## 2024-10-21 ENCOUNTER — APPOINTMENT (OUTPATIENT)
Dept: ORTHOPEDIC SURGERY | Facility: CLINIC | Age: 34
End: 2024-10-21
Payer: COMMERCIAL

## 2024-10-21 ENCOUNTER — NON-APPOINTMENT (OUTPATIENT)
Age: 34
End: 2024-10-21

## 2024-10-21 VITALS — BODY MASS INDEX: 27.59 KG/M2 | HEIGHT: 74 IN | WEIGHT: 215 LBS

## 2024-10-21 DIAGNOSIS — S82.251S: ICD-10-CM

## 2024-10-21 PROCEDURE — 99203 OFFICE O/P NEW LOW 30 MIN: CPT

## 2024-10-21 PROCEDURE — 73590 X-RAY EXAM OF LOWER LEG: CPT | Mod: RT
